# Patient Record
Sex: MALE | Race: WHITE | URBAN - METROPOLITAN AREA
[De-identification: names, ages, dates, MRNs, and addresses within clinical notes are randomized per-mention and may not be internally consistent; named-entity substitution may affect disease eponyms.]

---

## 2023-08-04 ENCOUNTER — OFFICE (OUTPATIENT)
Dept: URBAN - METROPOLITAN AREA CLINIC 102 | Facility: CLINIC | Age: 53
Setting detail: OPHTHALMOLOGY
End: 2023-08-04
Payer: COMMERCIAL

## 2023-08-04 DIAGNOSIS — H35.413: ICD-10-CM

## 2023-08-04 DIAGNOSIS — H52.4: ICD-10-CM

## 2023-08-04 DIAGNOSIS — H43.393: ICD-10-CM

## 2023-08-04 PROCEDURE — 92014 COMPRE OPH EXAM EST PT 1/>: CPT | Performed by: OPHTHALMOLOGY

## 2023-08-04 PROCEDURE — 92015 DETERMINE REFRACTIVE STATE: CPT | Performed by: OPHTHALMOLOGY

## 2023-08-04 ASSESSMENT — REFRACTION_CURRENTRX
OS_OVR_VA: 20/
OS_AXIS: 166
OD_OVR_VA: 20/
OS_ADD: +1.00
OD_VPRISM_DIRECTION: SV
OS_VPRISM_DIRECTION: SV
OS_SPHERE: -10.00
OD_SPHERE: -11.00
OD_ADD: +1.00
OD_AXIS: 009
OS_CYLINDER: -2.50
OD_CYLINDER: -3.00
OS_AXIS: 170
OS_OVR_VA: 20/
OD_CYLINDER: -2.50
OD_OVR_VA: 20/
OD_AXIS: 005
OS_CYLINDER: -3.25
OD_SPHERE: -10.75
OS_SPHERE: -10.00

## 2023-08-04 ASSESSMENT — SPHEQUIV_DERIVED
OS_SPHEQUIV: -9.875
OD_SPHEQUIV: -11
OD_SPHEQUIV: -11.875
OS_SPHEQUIV: -10.875

## 2023-08-04 ASSESSMENT — VISUAL ACUITY
OS_BCVA: 20/20
OD_BCVA: 20/20

## 2023-08-04 ASSESSMENT — KERATOMETRY
OS_AXISANGLE_DEGREES: 083
OD_K1POWER_DIOPTERS: 43.25
OS_K2POWER_DIOPTERS: 45.75
OD_AXISANGLE_DEGREES: 093
OS_K1POWER_DIOPTERS: 43.25
OD_K2POWER_DIOPTERS: 45.25
METHOD_AUTO_MANUAL: AUTO

## 2023-08-04 ASSESSMENT — REFRACTION_MANIFEST
OS_CYLINDER: -2.75
OS_ADD: +1.50
OD_AXIS: 180
OU_VA: 20/20
OS_VA1: 20/20
OD_SPHERE: -10.50
OS_AXIS: 175
OD_CYLINDER: -2.75
OD_ADD: +1.50
OS_SPHERE: -9.50
OD_VA1: 20/20

## 2023-08-04 ASSESSMENT — TONOMETRY
OS_IOP_MMHG: 13
OD_IOP_MMHG: 16

## 2023-08-04 ASSESSMENT — REFRACTION_AUTOREFRACTION
OD_AXIS: 005
OD_CYLINDER: -2.50
OD_SPHERE: -9.75
OS_SPHERE: -8.50
OS_CYLINDER: -2.75
OS_AXIS: 171

## 2023-08-04 ASSESSMENT — CONFRONTATIONAL VISUAL FIELD TEST (CVF)
OS_FINDINGS: FULL
OD_FINDINGS: FULL

## 2023-08-04 ASSESSMENT — AXIALLENGTH_DERIVED
OD_AL: 28.4233
OD_AL: 28.9269
OS_AL: 27.6741
OS_AL: 28.2208

## 2024-04-01 ENCOUNTER — EMERGENCY (EMERGENCY)
Facility: HOSPITAL | Age: 54
LOS: 0 days | Discharge: HOME | End: 2024-04-01
Attending: EMERGENCY MEDICINE
Payer: COMMERCIAL

## 2024-04-01 VITALS
TEMPERATURE: 98 F | SYSTOLIC BLOOD PRESSURE: 164 MMHG | OXYGEN SATURATION: 96 % | DIASTOLIC BLOOD PRESSURE: 93 MMHG | RESPIRATION RATE: 18 BRPM | HEART RATE: 68 BPM

## 2024-04-01 VITALS
HEART RATE: 64 BPM | RESPIRATION RATE: 17 BRPM | TEMPERATURE: 98 F | OXYGEN SATURATION: 98 % | SYSTOLIC BLOOD PRESSURE: 157 MMHG | DIASTOLIC BLOOD PRESSURE: 91 MMHG

## 2024-04-01 DIAGNOSIS — K52.9 NONINFECTIVE GASTROENTERITIS AND COLITIS, UNSPECIFIED: ICD-10-CM

## 2024-04-01 DIAGNOSIS — K92.1 MELENA: ICD-10-CM

## 2024-04-01 DIAGNOSIS — Z20.822 CONTACT WITH AND (SUSPECTED) EXPOSURE TO COVID-19: ICD-10-CM

## 2024-04-01 DIAGNOSIS — E78.5 HYPERLIPIDEMIA, UNSPECIFIED: ICD-10-CM

## 2024-04-01 LAB
ALBUMIN SERPL ELPH-MCNC: 3.8 G/DL — SIGNIFICANT CHANGE UP (ref 3.3–5)
ALP SERPL-CCNC: 97 U/L — SIGNIFICANT CHANGE UP (ref 40–120)
ALT FLD-CCNC: 23 U/L — SIGNIFICANT CHANGE UP (ref 12–78)
ANION GAP SERPL CALC-SCNC: 5 MMOL/L — SIGNIFICANT CHANGE UP (ref 5–17)
APTT BLD: 33.6 SEC — SIGNIFICANT CHANGE UP (ref 24.5–35.6)
AST SERPL-CCNC: 12 U/L — LOW (ref 15–37)
BASOPHILS # BLD AUTO: 0.02 K/UL — SIGNIFICANT CHANGE UP (ref 0–0.2)
BASOPHILS NFR BLD AUTO: 0.4 % — SIGNIFICANT CHANGE UP (ref 0–2)
BILIRUB SERPL-MCNC: 0.6 MG/DL — SIGNIFICANT CHANGE UP (ref 0.2–1.2)
BUN SERPL-MCNC: 10 MG/DL — SIGNIFICANT CHANGE UP (ref 7–23)
CALCIUM SERPL-MCNC: 9.7 MG/DL — SIGNIFICANT CHANGE UP (ref 8.5–10.1)
CHLORIDE SERPL-SCNC: 105 MMOL/L — SIGNIFICANT CHANGE UP (ref 96–108)
CO2 SERPL-SCNC: 30 MMOL/L — SIGNIFICANT CHANGE UP (ref 22–31)
CREAT SERPL-MCNC: 0.99 MG/DL — SIGNIFICANT CHANGE UP (ref 0.5–1.3)
EGFR: 92 ML/MIN/1.73M2 — SIGNIFICANT CHANGE UP
EOSINOPHIL # BLD AUTO: 0.04 K/UL — SIGNIFICANT CHANGE UP (ref 0–0.5)
EOSINOPHIL NFR BLD AUTO: 0.9 % — SIGNIFICANT CHANGE UP (ref 0–6)
FLUAV AG NPH QL: SIGNIFICANT CHANGE UP
FLUBV AG NPH QL: SIGNIFICANT CHANGE UP
GLUCOSE SERPL-MCNC: 117 MG/DL — HIGH (ref 70–99)
HCT VFR BLD CALC: 39.3 % — SIGNIFICANT CHANGE UP (ref 39–50)
HGB BLD-MCNC: 13.2 G/DL — SIGNIFICANT CHANGE UP (ref 13–17)
IMM GRANULOCYTES NFR BLD AUTO: 0 % — SIGNIFICANT CHANGE UP (ref 0–0.9)
INR BLD: 0.97 RATIO — SIGNIFICANT CHANGE UP (ref 0.85–1.18)
LYMPHOCYTES # BLD AUTO: 0.73 K/UL — LOW (ref 1–3.3)
LYMPHOCYTES # BLD AUTO: 15.6 % — SIGNIFICANT CHANGE UP (ref 13–44)
MCHC RBC-ENTMCNC: 30.7 PG — SIGNIFICANT CHANGE UP (ref 27–34)
MCHC RBC-ENTMCNC: 33.6 GM/DL — SIGNIFICANT CHANGE UP (ref 32–36)
MCV RBC AUTO: 91.4 FL — SIGNIFICANT CHANGE UP (ref 80–100)
MONOCYTES # BLD AUTO: 0.56 K/UL — SIGNIFICANT CHANGE UP (ref 0–0.9)
MONOCYTES NFR BLD AUTO: 12 % — SIGNIFICANT CHANGE UP (ref 2–14)
NEUTROPHILS # BLD AUTO: 3.33 K/UL — SIGNIFICANT CHANGE UP (ref 1.8–7.4)
NEUTROPHILS NFR BLD AUTO: 71.1 % — SIGNIFICANT CHANGE UP (ref 43–77)
PLATELET # BLD AUTO: 140 K/UL — LOW (ref 150–400)
POTASSIUM SERPL-MCNC: 4.2 MMOL/L — SIGNIFICANT CHANGE UP (ref 3.5–5.3)
POTASSIUM SERPL-SCNC: 4.2 MMOL/L — SIGNIFICANT CHANGE UP (ref 3.5–5.3)
PROT SERPL-MCNC: 7.6 GM/DL — SIGNIFICANT CHANGE UP (ref 6–8.3)
PROTHROM AB SERPL-ACNC: 11 SEC — SIGNIFICANT CHANGE UP (ref 9.5–13)
RBC # BLD: 4.3 M/UL — SIGNIFICANT CHANGE UP (ref 4.2–5.8)
RBC # FLD: 12.6 % — SIGNIFICANT CHANGE UP (ref 10.3–14.5)
RSV RNA NPH QL NAA+NON-PROBE: SIGNIFICANT CHANGE UP
SARS-COV-2 RNA SPEC QL NAA+PROBE: SIGNIFICANT CHANGE UP
SODIUM SERPL-SCNC: 140 MMOL/L — SIGNIFICANT CHANGE UP (ref 135–145)
WBC # BLD: 4.68 K/UL — SIGNIFICANT CHANGE UP (ref 3.8–10.5)
WBC # FLD AUTO: 4.68 K/UL — SIGNIFICANT CHANGE UP (ref 3.8–10.5)

## 2024-04-01 PROCEDURE — 85025 COMPLETE CBC W/AUTO DIFF WBC: CPT

## 2024-04-01 PROCEDURE — 71046 X-RAY EXAM CHEST 2 VIEWS: CPT | Mod: 26

## 2024-04-01 PROCEDURE — 80053 COMPREHEN METABOLIC PANEL: CPT

## 2024-04-01 PROCEDURE — 86900 BLOOD TYPING SEROLOGIC ABO: CPT

## 2024-04-01 PROCEDURE — 74178 CT ABD&PLV WO CNTR FLWD CNTR: CPT | Mod: 26,MC

## 2024-04-01 PROCEDURE — 99284 EMERGENCY DEPT VISIT MOD MDM: CPT | Mod: 25

## 2024-04-01 PROCEDURE — 36415 COLL VENOUS BLD VENIPUNCTURE: CPT

## 2024-04-01 PROCEDURE — 99285 EMERGENCY DEPT VISIT HI MDM: CPT

## 2024-04-01 PROCEDURE — 74178 CT ABD&PLV WO CNTR FLWD CNTR: CPT | Mod: MC

## 2024-04-01 PROCEDURE — 0241U: CPT

## 2024-04-01 PROCEDURE — 86901 BLOOD TYPING SEROLOGIC RH(D): CPT

## 2024-04-01 PROCEDURE — 85730 THROMBOPLASTIN TIME PARTIAL: CPT

## 2024-04-01 PROCEDURE — 71046 X-RAY EXAM CHEST 2 VIEWS: CPT

## 2024-04-01 PROCEDURE — 86850 RBC ANTIBODY SCREEN: CPT

## 2024-04-01 PROCEDURE — 85610 PROTHROMBIN TIME: CPT

## 2024-04-01 NOTE — ED STATDOCS - CARE PROVIDER_API CALL
Live Lopez  Gastroenterology  [facility], [address] [address]  Phone: [phone]  Fax: [phone]  Follow Up Time: 1-3 Days

## 2024-04-01 NOTE — ED STATDOCS - PHYSICAL EXAMINATION
Constitutional: NAD AOx3  Eyes: PERRL EOMI  Head: Normocephalic atraumatic  Mouth: MMM  Cardiac: regular rate and rhythm  Resp: Lungs CTAB  GI: Abd s/nd. LLQ TTP.   Neuro: CN2-12 grossly intact, BONILLA x 4  Skin: No visible rashes

## 2024-04-01 NOTE — ED STATDOCS - NSFOLLOWUPINSTRUCTIONS_ED_ALL_ED_FT
Please follow-up with your primary care doctor/  Gastroenterology.  Please call for an appointment in the next 48 hours but if you cannot follow-up with your primary care doctor please return to the Emergency Department for any urgent issues.    You were given a copy of the tests performed today.  Please bring the results with you and review them with your primary care doctor.    If you have any worsening of symptoms or any other concerns please return to the Emergency Department immediately.    Please continue taking your home medications as directed.      SEEK IMMEDIATE MEDICAL CARE IF YOU HAVE ANY OF THE FOLLOWING SYMPTOMS: worsening abdominal pain, uncontrollable vomiting, profuse diarrhea, inability to have bowel movements or pass gas, black or bloody stools, fever accompanying chest pain or back pain, or fainting. These symptoms may represent a serious problem that is an emergency. Do not wait to see if the symptoms will go away. Get medical help right away. Call 911 and do not drive yourself to the hospital.    Colitis is a condition in which the colon is inflamed. It can cause diarrhea, blood in the stool, and abdominal pain. Colitis can last a short time (be acute), or it may last a long time (become chronic).    What are the causes?  This condition may be caused by:  Infections from viruses or bacteria.  A reaction to medicine.  Certain autoimmune diseases, such as Crohn's disease or ulcerative colitis.  Radiation treatment.  Decreased blood flow to the bowel (ischemia).  What are the signs or symptoms?  Symptoms of this condition include:  Diarrhea, blood in the stool, or black, tarry stool.  Pain in the joints or abdominal pain.  Fever or fatigue.  Vomiting.  Weight loss.  Bloating.  Having fewer bowel movements than usual.  A strong and sudden urge to have a bowel movement.  Feeling like the bowel is not empty after a bowel movement.  How is this diagnosed?  This condition may be diagnosed based on a stool test and a blood test. You may also have other tests, such as:  X-rays.  CT scan.  Colonoscopy.  Endoscopy.  Biopsy.  How is this treated?  Treatment for this condition depends on the cause. This condition may be treated with:  Steps to rest the bowel, such as not eating or drinking for a period of time.  Fluids that are given through an IV.  Medicine for pain and diarrhea.  Antibiotic medicines.  Cortisone medicines.  Surgery.  Follow these instructions at home:  Eating and drinking      Follow instructions from your health care provider about eating or drinking restrictions.  Drink enough fluid to keep your urine pale yellow.  Work with a dietitian to determine whether certain foods cause your condition to flare up.  Avoid foods or drinks that cause flare-ups.  Eat a well-balanced diet.  General instructions    If you were prescribed an antibiotic medicine, take it as told by your health care provider. Do not stop taking the antibiotic even if you start to feel better.  Take over-the-counter and prescription medicines only as told by your health care provider.  Keep all follow-up visits. This is important.  Contact a health care provider if:  Your symptoms do not go away.  You develop new symptoms.  Get help right away if:  You have a fever that does not go away with treatment.  You develop chills.  You have extreme weakness, fainting, or dehydration.  You vomit repeatedly.  You develop severe pain in your abdomen.  You pass bloody or tarry stool.  Summary  Colitis is a condition in which the colon is inflamed. Colitis can last a short time (be acute), or it may last a long time (become chronic).  Treatment for this condition depends on the cause and may include resting the bowel, taking medicines, or having surgery.  If you were prescribed an antibiotic medicine, take it as told by your health care provider. Do not stop taking the antibiotic even if you start to feel better.  Get help right away if you develop severe pain in your abdomen.  Keep all follow-up visits. This is important.  This information is not intended to replace advice given to you by your health care provider. Make sure you discuss any questions you have with your health care provider.

## 2024-04-01 NOTE — ED STATDOCS - PROGRESS NOTE DETAILS
Stable on reassessment.  Abdomen remains soft and nontender.  No bowel movements during ED stay.  Labs and imaging independently reviewed and interpreted with note of questionable infectious versus inflammatory colitis.  Return precautions discussed with verbal understanding.  Patient agrees with discharge and GI follow-up.  Offered prednisone taper however patient wishes to see GI prior to starting treatment. -DO Gudelia

## 2024-04-01 NOTE — ED STATDOCS - PATIENT PORTAL LINK FT
You can access the FollowMyHealth Patient Portal offered by [facility] by registering at the following website: [email or web address]. By joining [employer]’s FollowMyHealth portal, you will also be able to view your health information using other applications (apps) compatible with our system.

## 2024-04-01 NOTE — ED ADULT NURSE NOTE - NSFALLUNIVINTERV_ED_ALL_ED
Bed/Stretcher in lowest position, wheels locked, appropriate side rails in place/Call bell, personal items and telephone in reach/Instruct patient to call for assistance before getting out of bed/chair/stretcher/Non-slip footwear applied when patient is off stretcher/[place] to call system/Physically safe environment - no spills, clutter or unnecessary equipment/Purposeful proactive rounding/Room/bathroom lighting operational, light cord in reach

## 2024-04-01 NOTE — ED STATDOCS - WR INTERPRETATION 1
CXR negative - minimal hilar adenopathy with No infiltrates, No consolidation, No atelectasis seen.

## 2024-04-01 NOTE — ED ADULT TRIAGE NOTE - CHIEF COMPLAINT QUOTE
SIB MD FOR RECTAL BLEEDING BRB X 3 DAYS. PT STATES " I WAS SICK THIS WEEK AND HAD DIARRHEA AND SPOKE TO MY MD WHO SAID TO COME." DENIES CP/SOB/N/V/D/FEVER/CHILLS. PMH: HIGH CHOLESTEROL.

## 2024-04-01 NOTE — ED STATDOCS - OBJECTIVE STATEMENT
52 y/o male with a PMHx of HLD presents to the ED c/o Pt reports 3 days ago he developed chest congestion and cough. Pt also reports he has had bloody BMs x2 days. Denies abd pain, fevers. Not on anticoagulation. No other complaints at this time. PCP: Dr. Arana.

## 2024-08-26 ENCOUNTER — APPOINTMENT (OUTPATIENT)
Dept: ORTHOPEDIC SURGERY | Facility: CLINIC | Age: 54
End: 2024-08-26
Payer: COMMERCIAL

## 2024-08-26 VITALS
WEIGHT: 205 LBS | BODY MASS INDEX: 28.7 KG/M2 | SYSTOLIC BLOOD PRESSURE: 130 MMHG | DIASTOLIC BLOOD PRESSURE: 79 MMHG | HEIGHT: 71 IN

## 2024-08-26 PROBLEM — Z00.00 ENCOUNTER FOR PREVENTIVE HEALTH EXAMINATION: Status: ACTIVE | Noted: 2024-08-26

## 2024-08-26 PROCEDURE — 72110 X-RAY EXAM L-2 SPINE 4/>VWS: CPT

## 2024-08-26 PROCEDURE — 99204 OFFICE O/P NEW MOD 45 MIN: CPT

## 2024-08-26 RX ORDER — METHYLPREDNISOLONE 4 MG/1
4 TABLET ORAL
Qty: 1 | Refills: 0 | Status: ACTIVE | COMMUNITY
Start: 2024-08-26 | End: 1900-01-01

## 2024-08-26 RX ORDER — TIZANIDINE 2 MG/1
2 TABLET ORAL
Qty: 24 | Refills: 0 | Status: ACTIVE | COMMUNITY
Start: 2024-08-26 | End: 1900-01-01

## 2024-08-26 NOTE — PHYSICAL EXAM
[de-identified] : Lumbar Physical Exam   Gait - Normal  Station - Normal   Sagittal balance - Normal   Compensatory mechanism? - None     Reflexes    Patellar - normal    Gastroc - normal    Clonus - No    Hip Exam - Normal   Straight leg raise - none   Pulses - 2+ dp/pt   Range of motion - normal    Sensation   Sensation intact to light touch in L1, L2, L3, L4, L5 and S1 dermatomes bilaterally     Motor             IP Quad HS TA Gastroc EHL   Right 5/5  5/5   5/5 5/5    5/5     5/5  Left   3+/5  5/5   5/5 5/5    5/5      3+/5  [de-identified] : Lumbar Rads 4 views:  Moderate Facet arthropathy  No abnormal motion on flex/ex Mild disc degeneration

## 2024-08-26 NOTE — ADDENDUM
[FreeTextEntry1] :  I, Desi Lambert, acted solely as a scribe for Dr. Hemant Cameron MD on this date 08/26/2024   All medical record entries made by the Scribe were at my, Dr. Hemant Cameron MD., direction and personally dictated by me on 08/26/2024. I have reviewed the chart and agree that the record accurately reflects my personal performance of the history, physical exam, assessment and plan. I have also personally directed, reviewed, and agreed with the chart.

## 2024-08-26 NOTE — HISTORY OF PRESENT ILLNESS
[de-identified] : Patient is a 54-year-old male who presents for initial eval of LT sided lower back pain beginning on Thursday, 08.22. Went for a 3 mile walk the next morning. Sxs were controlled with Ibuprofen. Reports discomfort when sleeping on Saturday and difficulty sitting/standing beginning yesterday. Details episodes of pain in LT buttock and using heating pad for pain relief. Shooting pain to the knee.

## 2024-08-26 NOTE — ASSESSMENT
[FreeTextEntry1] : This is a 54 year male with lower bp and LT LE radicular sxs. I had a lengthy discussion with the patient in regard to their treatment plan and diagnosis. Their symptoms have persisted despite the conservative management they have attempted thus far. As a result, I would like to proceed with a lumbar MRI. In tandem with this they should begin a home therapy program. The patient can take medrol dosepack, Tizanidine, Tylenol/NSAIDs as needed for pain control if medically able to. I will have the patient follow-up in 3 to 4 weeks for repeat clinical evaluation, and to review their MRI results. I encouraged them to follow-up sooner if their symptoms worsen or change in any way. Conservative Treatment Statement The patient has tried the following treatments: Activity modification + Ice/Compression + Braces - NSAIDS + Physical Therapy +   Please note the above modalities have been tried for 6+ weeks over the past 2 months.

## 2024-09-07 PROBLEM — M54.9 ACUTE BACK PAIN, UNSPECIFIED BACK LOCATION, UNSPECIFIED BACK PAIN LATERALITY: Status: ACTIVE | Noted: 2024-08-26

## 2024-09-09 ENCOUNTER — APPOINTMENT (OUTPATIENT)
Dept: ORTHOPEDIC SURGERY | Facility: CLINIC | Age: 54
End: 2024-09-09
Payer: COMMERCIAL

## 2024-09-09 DIAGNOSIS — M54.9 DORSALGIA, UNSPECIFIED: ICD-10-CM

## 2024-09-09 PROCEDURE — 99214 OFFICE O/P EST MOD 30 MIN: CPT

## 2024-09-09 NOTE — HISTORY OF PRESENT ILLNESS
[de-identified] : 55 yo male following up for his low back and left sided radiculopathy. An MRI lumbar spine was denied by his insurance company.  Today he states that overall he is approxi-90% better.  He denies any severe radiating type pain.  He does have some buttock pain but this is stable.  He denies any bowel bladder issues.  He denies any saddle anesthesia.  8/26/2024 Patient is a 54-year-old male who presents for initial eval of LT sided lower back pain beginning on Thursday, 08.22. Went for a 3 mile walk the next morning. Sxs were controlled with Ibuprofen. Reports discomfort when sleeping on Saturday and difficulty sitting/standing beginning yesterday. Details episodes of pain in LT buttock and using heating pad for pain relief. Shooting pain to the knee.

## 2024-09-09 NOTE — HISTORY OF PRESENT ILLNESS
[de-identified] : 53 yo male following up for his low back and left sided radiculopathy. An MRI lumbar spine was denied by his insurance company.  Today he states that overall he is approxi-90% better.  He denies any severe radiating type pain.  He does have some buttock pain but this is stable.  He denies any bowel bladder issues.  He denies any saddle anesthesia.  8/26/2024 Patient is a 54-year-old male who presents for initial eval of LT sided lower back pain beginning on Thursday, 08.22. Went for a 3 mile walk the next morning. Sxs were controlled with Ibuprofen. Reports discomfort when sleeping on Saturday and difficulty sitting/standing beginning yesterday. Details episodes of pain in LT buttock and using heating pad for pain relief. Shooting pain to the knee.

## 2024-09-09 NOTE — PHYSICAL EXAM
[de-identified] : Lumbar Physical Exam  Gait - Normal  Station - Normal  Sagittal balance - Normal  Compensatory mechanism? - None  Heel walk - Normal  Toe walk - Normal  Reflexes Patellar - normal Gastroc - normal Clonus - No  Hip Exam - Normal  Straight leg raise - none  Pulses - 2+ dp/pt  Range of motion - normal  Sensation  Sensation intact to light touch in L1, L2, L3, L4, L5 and S1 dermatomes bilaterally  Motor 	IP	Quad	HS	TA	Gastroc	EHL Right	5/5	5/5	5/5	5/5	5/5	5/5 Left	5/5	5/5	5/5	5/5	5/5	5/5 [de-identified] : Previous imaging: Lumbar Rads 4 views:  Moderate Facet arthropathy  No abnormal motion on flex/ex Mild disc degeneration

## 2024-09-09 NOTE — PHYSICAL EXAM
[de-identified] : Lumbar Physical Exam  Gait - Normal  Station - Normal  Sagittal balance - Normal  Compensatory mechanism? - None  Heel walk - Normal  Toe walk - Normal  Reflexes Patellar - normal Gastroc - normal Clonus - No  Hip Exam - Normal  Straight leg raise - none  Pulses - 2+ dp/pt  Range of motion - normal  Sensation  Sensation intact to light touch in L1, L2, L3, L4, L5 and S1 dermatomes bilaterally  Motor 	IP	Quad	HS	TA	Gastroc	EHL Right	5/5	5/5	5/5	5/5	5/5	5/5 Left	5/5	5/5	5/5	5/5	5/5	5/5 [de-identified] : Previous imaging: Lumbar Rads 4 views:  Moderate Facet arthropathy  No abnormal motion on flex/ex Mild disc degeneration

## 2024-10-07 ENCOUNTER — OFFICE (OUTPATIENT)
Dept: URBAN - METROPOLITAN AREA CLINIC 102 | Facility: CLINIC | Age: 54
Setting detail: OPHTHALMOLOGY
End: 2024-10-07
Payer: COMMERCIAL

## 2024-10-07 DIAGNOSIS — H52.4: ICD-10-CM

## 2024-10-07 DIAGNOSIS — H43.393: ICD-10-CM

## 2024-10-07 DIAGNOSIS — H35.413: ICD-10-CM

## 2024-10-07 PROCEDURE — 92014 COMPRE OPH EXAM EST PT 1/>: CPT | Performed by: OPHTHALMOLOGY

## 2024-10-07 PROCEDURE — 92015 DETERMINE REFRACTIVE STATE: CPT | Performed by: OPHTHALMOLOGY

## 2024-10-07 ASSESSMENT — REFRACTION_CURRENTRX
OS_OVR_VA: 20/
OD_OVR_VA: 20/
OS_AXIS: 166
OD_SPHERE: -11.00
OD_SPHERE: -10.75
OD_CYLINDER: -3.00
OD_OVR_VA: 20/
OD_VPRISM_DIRECTION: SV
OS_AXIS: 170
OD_ADD: +1.00
OS_CYLINDER: -3.25
OS_SPHERE: -10.00
OS_VPRISM_DIRECTION: SV
OD_CYLINDER: -2.50
OD_AXIS: 005
OD_AXIS: 009
OS_OVR_VA: 20/
OS_ADD: +1.00
OS_SPHERE: -10.00
OS_CYLINDER: -2.50

## 2024-10-07 ASSESSMENT — REFRACTION_MANIFEST
OD_VA1: 20/20
OD_CYLINDER: -2.50
OU_VA: 20/20
OD_SPHERE: -10.25
OD_CYLINDER: -2.75
OU_VA: 20/20
OS_ADD: +2.00
OD_ADD: +2.00
OS_SPHERE: -8.75
OS_VA1: 20/20
OS_CYLINDER: -2.75
OD_VA1: 20/20
OD_AXIS: 180
OS_ADD: +1.50
OS_SPHERE: -9.50
OD_SPHERE: -10.50
OD_AXIS: 180
OD_ADD: +1.50
OS_AXIS: 170
OS_CYLINDER: -2.75
OS_AXIS: 175
OS_VA1: 20/20-

## 2024-10-07 ASSESSMENT — CONFRONTATIONAL VISUAL FIELD TEST (CVF)
OS_FINDINGS: FULL
OD_FINDINGS: FULL

## 2024-10-07 ASSESSMENT — TONOMETRY
OD_IOP_MMHG: 12
OS_IOP_MMHG: 12

## 2024-10-07 ASSESSMENT — VISUAL ACUITY
OD_BCVA: 20/20-2
OS_BCVA: 20/30-1

## 2024-10-07 ASSESSMENT — KERATOMETRY
OS_AXISANGLE_DEGREES: 083
OD_AXISANGLE_DEGREES: 095
METHOD_AUTO_MANUAL: AUTO
OD_K2POWER_DIOPTERS: 45.25
OS_K2POWER_DIOPTERS: 45.75
OD_K1POWER_DIOPTERS: 43.25
OS_K1POWER_DIOPTERS: 43.50

## 2024-10-07 ASSESSMENT — REFRACTION_AUTOREFRACTION
OS_SPHERE: -8.50
OD_SPHERE: -9.50
OS_AXIS: 167
OS_CYLINDER: -2.75
OD_AXIS: 001
OD_CYLINDER: -2.50

## 2024-10-11 ENCOUNTER — APPOINTMENT (OUTPATIENT)
Dept: ORTHOPEDIC SURGERY | Facility: CLINIC | Age: 54
End: 2024-10-11